# Patient Record
Sex: MALE | Race: WHITE | NOT HISPANIC OR LATINO | Employment: FULL TIME | URBAN - METROPOLITAN AREA
[De-identification: names, ages, dates, MRNs, and addresses within clinical notes are randomized per-mention and may not be internally consistent; named-entity substitution may affect disease eponyms.]

---

## 2024-04-09 DIAGNOSIS — Z23 NEED FOR TDAP VACCINATION: ICD-10-CM

## 2024-04-09 PROCEDURE — 90471 IMMUNIZATION ADMIN: CPT | Performed by: NURSE PRACTITIONER

## 2024-04-09 PROCEDURE — 90715 TDAP VACCINE 7 YRS/> IM: CPT | Performed by: NURSE PRACTITIONER

## 2025-02-06 ENCOUNTER — HOSPITAL ENCOUNTER (EMERGENCY)
Facility: HOSPITAL | Age: 35
Discharge: HOME | End: 2025-02-06
Attending: EMERGENCY MEDICINE
Payer: COMMERCIAL

## 2025-02-06 ENCOUNTER — APPOINTMENT (OUTPATIENT)
Dept: RADIOLOGY | Facility: HOSPITAL | Age: 35
End: 2025-02-06
Payer: COMMERCIAL

## 2025-02-06 VITALS
BODY MASS INDEX: 30.31 KG/M2 | RESPIRATION RATE: 18 BRPM | DIASTOLIC BLOOD PRESSURE: 80 MMHG | HEIGHT: 68 IN | WEIGHT: 200 LBS | SYSTOLIC BLOOD PRESSURE: 132 MMHG | HEART RATE: 71 BPM | OXYGEN SATURATION: 100 % | TEMPERATURE: 98.1 F
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
## 2025-02-06 DIAGNOSIS — S09.90XA CLOSED HEAD INJURY, INITIAL ENCOUNTER: ICD-10-CM

## 2025-02-06 DIAGNOSIS — S63.637A SPRAIN OF INTERPHALANGEAL JOINT OF LEFT LITTLE FINGER, INITIAL ENCOUNTER: ICD-10-CM

## 2025-02-06 PROCEDURE — 99284 EMERGENCY DEPT VISIT MOD MDM: CPT | Mod: 25 | Performed by: EMERGENCY MEDICINE

## 2025-02-06 PROCEDURE — 99285 EMERGENCY DEPT VISIT HI MDM: CPT | Mod: 25

## 2025-02-06 PROCEDURE — 70450 CT HEAD/BRAIN W/O DYE: CPT

## 2025-02-06 PROCEDURE — 73130 X-RAY EXAM OF HAND: CPT | Mod: LEFT SIDE | Performed by: RADIOLOGY

## 2025-02-06 PROCEDURE — 73130 X-RAY EXAM OF HAND: CPT | Mod: LT

## 2025-02-06 ASSESSMENT — COLUMBIA-SUICIDE SEVERITY RATING SCALE - C-SSRS
1. IN THE PAST MONTH, HAVE YOU WISHED YOU WERE DEAD OR WISHED YOU COULD GO TO SLEEP AND NOT WAKE UP?: NO
6. HAVE YOU EVER DONE ANYTHING, STARTED TO DO ANYTHING, OR PREPARED TO DO ANYTHING TO END YOUR LIFE?: NO
2. HAVE YOU ACTUALLY HAD ANY THOUGHTS OF KILLING YOURSELF?: NO

## 2025-02-06 ASSESSMENT — LIFESTYLE VARIABLES
EVER HAD A DRINK FIRST THING IN THE MORNING TO STEADY YOUR NERVES TO GET RID OF A HANGOVER: NO
HAVE PEOPLE ANNOYED YOU BY CRITICIZING YOUR DRINKING: NO
HAVE YOU EVER FELT YOU SHOULD CUT DOWN ON YOUR DRINKING: NO
TOTAL SCORE: 0
EVER FELT BAD OR GUILTY ABOUT YOUR DRINKING: NO

## 2025-02-06 ASSESSMENT — PAIN SCALES - GENERAL
PAINLEVEL_OUTOF10: 5 - MODERATE PAIN
PAINLEVEL_OUTOF10: 5 - MODERATE PAIN

## 2025-02-06 ASSESSMENT — PAIN DESCRIPTION - LOCATION: LOCATION: HEAD

## 2025-02-06 ASSESSMENT — PAIN - FUNCTIONAL ASSESSMENT
PAIN_FUNCTIONAL_ASSESSMENT: 0-10
PAIN_FUNCTIONAL_ASSESSMENT: 0-10

## 2025-02-06 ASSESSMENT — PAIN DESCRIPTION - PAIN TYPE: TYPE: ACUTE PAIN

## 2025-02-06 ASSESSMENT — PAIN DESCRIPTION - DESCRIPTORS: DESCRIPTORS: ACHING

## 2025-02-06 NOTE — ED PROVIDER NOTES
HPI   Chief Complaint   Patient presents with    head injury/ positive loc/ no thinners       HPI: []  34-year-old male history comes in with mechanical fall.  He states he slipped on ice hitting back of head on the ground was very brief 2 seconds LOC.  Also complained of left fifth digit pain.  He has no chest pain shortness breath fever chills nausea vomit diarrhea cough congestion incontinence of seizures.  No neck pain.  No paresthesia.  No altered mental status.  No blood thinners.  No acute pain.  No neck or back pain.    Past history: None  Social: Patient denies current tobacco alcohol drug use.  REVIEW OF SYSTEMS:    GENERAL.: No weight loss, fatigue, anorexia, insomnia, fever.    EYES: No vision loss, double vision, drainage, eye pain.    ENT: No pharyngitis, dry mouth.    CARDIOPULMONARY: No chest pain, palpitations, syncope, near syncope. No shortness of breath, cough, hemoptysis.    GI: No abdominal pain, change in bowel habits, melena, hematemesis, hematochezia, nausea, vomiting, diarrhea.    : No discharge, dysuria, frequency, urgency, hematuria.    MS: No limb pain, joint pain, joint swelling.  Positive for left fifth digit pain    SKIN: No rashes.    PSYCH: No depression, anxiety, suicidality, homicidality.    Review of systems is otherwise negative unless stated above or in history of present illness.  Social history, family history, allergies reviewed.  PHYSICAL EXAM:    GENERAL: Vitals noted, no distress. Alert and oriented  x 3. Non-toxic.    Eyes: Pupils equal round react light accommodation EOMs are intact  EENT: TMs clear. Posterior oropharynx unremarkable. No meningismus. No LAD.  Negative hemotympanum negative Lamb sign negative mastoid tenderness    NECK: Supple. Nontender. No midline tenderness.     CARDIAC: Regular, rate, rhythm. No murmurs rubs or gallops. No JVD    PULMONARY: Lungs clear bilaterally with good aeration. No wheezes rales or rhonchi. No respiratory distress.      ABDOMEN: Soft, nonsurgical. Nontender. No peritoneal signs. Normoactive bowel sounds. No pulsatile masses.     EXTREMITIES: No peripheral edema. Negative Homans bilaterally, no cords.  2+ bounding pulses well-perfused  Musculoskeletal: Patient no midline C, T, L-spine tenderness pelvis stable good range of motion of both hip knees and ankles.  Left upper EXTR no deformity left fifth digit had no deformity tender palpation of the PIP joint.  Good range of motion fairly decent neurovasc intact bounding pulses and normal cap refill.  SKIN: No rash. Intact.     NEURO: No focal neurologic deficits, NIH score of 0. Cranial nerves normal as tested from II through XII.     MEDICAL DECISION MAKING:  CT head is negative for x-ray of the hand is negative    Treatment today: Patient's wrist was immobilized in a sling splint.  Aluminum    ED course: Patient remained stable hemodynamic.  GCS 15.    Impression: #1 mechanical fall, #2 closed head injury, #3 finger sprain    Plan set MDM: 34-year-old white male comes in with mechanical fall with no obvious injury save for left finger sprain.  Suspicion for traumatic injury of the chest and pelvis low.  Suspicion recommended to C-spine T-spine L-spine negative.  Patient no midline tenderness.  Neurology intact.  Suspicion was central cord syndrome is low.  Patient be discharged home supportive care immobilizing as aluminum splint for his finger sprain outpatient follow recommended primary doctor and orthopedic surgery with strict return precaution.              Patient History   History reviewed. No pertinent past medical history.  History reviewed. No pertinent surgical history.  No family history on file.  Social History     Tobacco Use    Smoking status: Never    Smokeless tobacco: Current     Types: Chew   Vaping Use    Vaping status: Never Used   Substance Use Topics    Alcohol use: Never    Drug use: Never       Physical Exam   ED Triage Vitals [02/06/25 0840]   Temperature  Heart Rate Respirations BP   36.7 °C (98.1 °F) 76 16 155/85      Pulse Ox Temp Source Heart Rate Source Patient Position   98 % Temporal Monitor Sitting      BP Location FiO2 (%)     Left arm --       Physical Exam      ED Course & [facility]   ED Course as of 02/06/25 1223   Thu Feb 06, 2025   1200 CT head negative x-ray was negative patient be discharged home with a aluminum splint to his fifth digit and outpatient follow-up [MT]      ED Course User Index  [MT] Kalli Finch MD         Diagnoses as of 02/06/25 1223   Fall, initial encounter   Closed head injury, initial encounter   Sprain of interphalangeal joint of left little finger, initial encounter                 No data recorded     [place] Coma Scale Score: 15 (02/06/25 0843 : Alva Ward, EMERITA)                           Medical Decision Making      Procedure  Procedures     Kalli Finch MD  02/06/25 8195

## 2025-02-07 ENCOUNTER — OFFICE VISIT (OUTPATIENT)
Dept: ORTHOPEDIC SURGERY | Facility: CLINIC | Age: 35
End: 2025-02-07
Payer: COMMERCIAL

## 2025-02-07 VITALS — WEIGHT: 200.6 LBS | HEIGHT: 68 IN | BODY MASS INDEX: 30.4 KG/M2

## 2025-02-07 DIAGNOSIS — S09.90XA CLOSED HEAD INJURY, INITIAL ENCOUNTER: ICD-10-CM

## 2025-02-07 DIAGNOSIS — S63.637A SPRAIN OF INTERPHALANGEAL JOINT OF LEFT LITTLE FINGER, INITIAL ENCOUNTER: Primary | ICD-10-CM
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
## 2025-02-07 PROCEDURE — 3008F BODY MASS INDEX DOCD: CPT | Performed by: EMERGENCY MEDICINE

## 2025-02-07 PROCEDURE — 99204 OFFICE O/P NEW MOD 45 MIN: CPT | Performed by: EMERGENCY MEDICINE

## 2025-02-07 NOTE — PROGRESS NOTES
Subjective    Patient ID: Chris Holden is a 34 y.o. male.    Chief Complaint: Pain of the Left Little Finger (New Patient. Patient coming in for left little finger. Patient slipped and fell backwards on ice yesterday and was seen @  [facility] 2/06/2025. Patient states not much pain in finger but now left wrist hurts.  Denies numbness/tingling. Patient finger has been splinted. Xray done 2/06/2025 at [facility]. )     Last Surgery: No surgery found  Last Surgery Date: No surgery found    Chris is a very pleasant 34-year-old right-hand-dominant  coming in with an injury to his left finger.  He slipped on ice and fell yesterday onto his left hand.  He does not remember exactly what happened because he also hit his head and lost consciousness.  He was seen by a different provider in the emergency department for his closed head injury.  He was referred here specifically for his left pinky finger.  He was placed in a soft aluminum splint and states that he is feeling much better already.  He has no weakness or numbness but he has some pain at the DIP and distal phalanx.  No other complaints or today.        Objective   Right Hand Exam   Right hand exam is normal.      Left Hand Exam     Tenderness   Left hand tenderness location: Some tenderness with palpation of the DIP and distal phalanx no bruising or subungual hematoma.     Range of Motion   The patient has normal left wrist ROM.    Muscle Strength   The patient has normal left wrist strength.    Other   Erythema: absent  Sensation: normal  Pulse: present    Comments:  Brisk cap refill.  No rotational or scissoring deformities.  Extension and flexion are intact at the MCP PIP and DIP joints.  Sensation is intact to light touch over both aspects of the left pinky.            Image Results:  X-rays of the left hand were reviewed and interpreted by me on 2/7/2025 and were grossly unremarkable without any evidence of acute injury or  fracture.    Assessment/Plan   Encounter Diagnoses:  Sprain of interphalangeal joint of left little finger, initial encounter    Fall, initial encounter    Closed head injury, initial encounter    No orders of the defined types were placed in this encounter.    No follow-ups on file.    We discussed his treatment options and agreed to place him in a stack splint for comfort.  He is going to follow-up with me as needed especially if he is continuing to have symptoms in the next 2 to 3 weeks.  I think he likely has a contusion.  No evidence of an extensor or flexor tendon injury.  No evidence of a fracture but even if he does have a small tuft fracture a short period of immobilization is likely all that would be needed.  No evidence of neurovascular injury.  He is going to call me in 2 to 3 weeks if he is still having symptoms.    ** Please excuse any errors in grammar or translation related to this dictation. Voice recognition software was utilized to prepare this document. **       Jose Adams MD  [facility]

## 2025-02-11 ENCOUNTER — APPOINTMENT (OUTPATIENT)
Dept: PRIMARY CARE | Facility: CLINIC | Age: 35
End: 2025-02-11
Payer: COMMERCIAL

## 2025-02-11 VITALS
BODY MASS INDEX: 30.92 KG/M2 | SYSTOLIC BLOOD PRESSURE: 122 MMHG | WEIGHT: 204 LBS | HEART RATE: 68 BPM | HEIGHT: 68 IN | DIASTOLIC BLOOD PRESSURE: 70 MMHG | TEMPERATURE: 97.8 F

## 2025-02-11 DIAGNOSIS — S63.637A SPRAIN OF INTERPHALANGEAL JOINT OF LEFT LITTLE FINGER, INITIAL ENCOUNTER: ICD-10-CM

## 2025-02-11 DIAGNOSIS — Z00.00 ROUTINE GENERAL MEDICAL EXAMINATION AT A HEALTH CARE FACILITY: Primary | ICD-10-CM

## 2025-02-11 PROCEDURE — 99385 PREV VISIT NEW AGE 18-39: CPT | Performed by: PHYSICIAN ASSISTANT

## 2025-02-11 PROCEDURE — 3008F BODY MASS INDEX DOCD: CPT | Performed by: PHYSICIAN ASSISTANT

## 2025-02-11 ASSESSMENT — ENCOUNTER SYMPTOMS
DYSPHORIC MOOD: 0
ARTHRALGIAS: 0
NAUSEA: 0
CONSTIPATION: 0
DIFFICULTY URINATING: 0
VOMITING: 0
HEADACHES: 0
DIARRHEA: 0
NERVOUS/ANXIOUS: 0
FATIGUE: 0
EYE PAIN: 0
MYALGIAS: 0
SHORTNESS OF BREATH: 0
DIZZINESS: 0
SORE THROAT: 0
ABDOMINAL PAIN: 0

## 2025-02-11 NOTE — PROGRESS NOTES
"Subjective   Patient ID: Chris Holden is a 34 y.o. male who presents for New Patient Visit and Annual Exam.    HPI   Here to establish.     No previous PCP in many years.     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    Working with ortho due to [facility] injury (fall and finger injury).     Drinks a lot of Mt Dew and drinks a few cups of coffee every day.     Was recently in ER for finger sprain (work related injury). Doing better.     Review of Systems   Constitutional:  Negative for fatigue.   HENT: Negative.  Negative for congestion, ear pain and sore throat.    Eyes:  Negative for pain and visual disturbance.   Respiratory:  Negative for shortness of breath.    Cardiovascular:  Negative for chest pain.   Gastrointestinal:  Negative for abdominal pain, constipation, diarrhea, nausea and vomiting.   Genitourinary:  Negative for difficulty urinating and testicular pain.   Musculoskeletal:  Negative for arthralgias and myalgias.   Skin:  Negative for rash.   Neurological:  Negative for dizziness and headaches.   Psychiatric/Behavioral:  Negative for dysphoric mood. The patient is not nervous/anxious.          History reviewed. No pertinent past medical history.   Family History   Problem Relation Name Age of Onset    Hearing loss Sister          Social History     Tobacco Use    Smoking status: Never    Smokeless tobacco: Current     Types: Chew   Vaping Use    Vaping status: Never Used   Substance Use Topics    Alcohol use: Not Currently    Drug use: Never        Objective   /70   Pulse 68   Temp 36.6 °C (97.8 °F)   Ht 1.727 m (5' 8\")   Wt 92.5 kg (204 lb)   BMI 31.02 kg/m²     Physical Exam  Vitals and nursing note reviewed.   Constitutional:       Appearance: Normal appearance. He is well-developed.   HENT:      Head: Normocephalic.      Right Ear: Tympanic membrane, ear canal and external ear normal.      Left Ear: Tympanic membrane, ear canal and external ear normal.      Nose: Nose " normal.      Mouth/Throat:      Mouth: Mucous membranes are moist.      Pharynx: Oropharynx is clear.   Eyes:      General: No scleral icterus.     Extraocular Movements: Extraocular movements intact.      Pupils: Pupils are equal, round, and reactive to light.   Cardiovascular:      Rate and Rhythm: Normal rate and regular rhythm.      Heart sounds: No murmur heard.  Pulmonary:      Effort: Pulmonary effort is normal.      Breath sounds: Normal breath sounds.   Abdominal:      Palpations: Abdomen is soft. There is no mass.      Tenderness: There is no abdominal tenderness.   Musculoskeletal:         General: Normal range of motion.      Cervical back: Neck supple.   Lymphadenopathy:      Cervical: No cervical adenopathy.   Skin:     General: Skin is warm and dry.   Neurological:      General: No focal deficit present.      Mental Status: He is alert.      Gait: Gait normal.   Psychiatric:         Mood and Affect: Mood normal.         Behavior: Behavior normal.         Assessment/Plan   Diagnoses and all orders for this visit:  Routine general medical examination at a health care facility  -     Basic Metabolic Panel; Future  -     Lipid Panel; Future  Sprain of interphalangeal joint of left little finger, initial encounter  -     Referral to Primary Care       Discussed wellness issues.   Discussed diet and exercise.   Encouraged wt loss.   Get fasting labs.   Follow up with [facility] doctor.   Follow up prn.

## 2025-02-17 DIAGNOSIS — Z00.00 ROUTINE GENERAL MEDICAL EXAMINATION AT A HEALTH CARE FACILITY: ICD-10-CM
